# Patient Record
Sex: FEMALE | Race: WHITE | NOT HISPANIC OR LATINO | URBAN - METROPOLITAN AREA
[De-identification: names, ages, dates, MRNs, and addresses within clinical notes are randomized per-mention and may not be internally consistent; named-entity substitution may affect disease eponyms.]

---

## 2020-09-27 ENCOUNTER — OFFICE VISIT (OUTPATIENT)
Dept: URBAN - METROPOLITAN AREA CLINIC 121 | Facility: CLINIC | Age: 65
End: 2020-09-27

## 2021-07-09 ENCOUNTER — OFFICE VISIT (OUTPATIENT)
Dept: URBAN - METROPOLITAN AREA CLINIC 63 | Facility: CLINIC | Age: 66
End: 2021-07-09

## 2021-08-09 ENCOUNTER — OFFICE VISIT (OUTPATIENT)
Dept: URBAN - METROPOLITAN AREA CLINIC 63 | Facility: CLINIC | Age: 66
End: 2021-08-09

## 2021-08-25 ENCOUNTER — OFFICE VISIT (OUTPATIENT)
Dept: URBAN - METROPOLITAN AREA CLINIC 63 | Facility: CLINIC | Age: 66
End: 2021-08-25

## 2021-10-01 ENCOUNTER — OFFICE VISIT (OUTPATIENT)
Dept: URBAN - METROPOLITAN AREA CLINIC 121 | Facility: CLINIC | Age: 66
End: 2021-10-01

## 2021-11-03 ENCOUNTER — OFFICE VISIT (OUTPATIENT)
Dept: URBAN - METROPOLITAN AREA CLINIC 63 | Facility: CLINIC | Age: 66
End: 2021-11-03

## 2022-02-18 ENCOUNTER — OFFICE VISIT (OUTPATIENT)
Dept: URBAN - METROPOLITAN AREA CLINIC 63 | Facility: CLINIC | Age: 67
End: 2022-02-18

## 2022-03-04 ENCOUNTER — IMPORTED ENCOUNTER (OUTPATIENT)
Dept: URBAN - METROPOLITAN AREA CLINIC 31 | Facility: CLINIC | Age: 67
End: 2022-03-04

## 2022-03-04 PROBLEM — INACTIVE: Noted: 2022-03-04

## 2022-03-04 PROBLEM — H25.13: Noted: 2022-03-04

## 2022-03-04 PROBLEM — H25.12: Noted: 2022-03-04

## 2022-03-04 PROBLEM — H25.11: Noted: 2022-03-04

## 2022-03-04 PROCEDURE — 92015 DETERMINE REFRACTIVE STATE: CPT

## 2022-03-04 PROCEDURE — 99204 OFFICE O/P NEW MOD 45 MIN: CPT

## 2022-03-04 NOTE — PATIENT DISCUSSION
Discussed the risks benefits alternatives and limitations of cataract surgery including infection bleeding loss of vision retinal tears detachment. The patient stated a full understanding and a desire to proceed with the procedure in both eyes. Refractive options were reviewed. Patient has elected to be optimized for distance vision in both eyes. The patient will still need glasses for reading and to possibly fine tune distance vision. Schedule KPE/IOL OD/OS discussed no driving prior to surgery. Return for an appointment for Admit. with Dr. Darion Newman. Needs Full Admit.

## 2022-03-10 ENCOUNTER — OFFICE VISIT (OUTPATIENT)
Dept: URBAN - METROPOLITAN AREA TELEHEALTH 2 | Facility: TELEHEALTH | Age: 67
End: 2022-03-10

## 2022-04-02 ASSESSMENT — TONOMETRY
OD_IOP_MMHG: 15
OS_IOP_MMHG: 15

## 2022-04-02 ASSESSMENT — VISUAL ACUITY
OS_CC: 20/250
OS_PH: SC 20/30
OD_PH: SC 20/50

## 2022-04-18 ENCOUNTER — SURGERY/PROCEDURE (OUTPATIENT)
Dept: URBAN - METROPOLITAN AREA SURGERY 17 | Facility: SURGERY | Age: 67
End: 2022-04-18

## 2022-04-18 DIAGNOSIS — H25.11: ICD-10-CM

## 2022-04-18 PROCEDURE — 66984 XCAPSL CTRC RMVL W/O ECP: CPT

## 2022-04-18 NOTE — PATIENT DISCUSSION
Discussed the risks benefits alternatives and limitations of cataract surgery including infection bleeding loss of vision retinal tears detachment. The patient stated a full understanding and a desire to proceed with the procedure in both eyes. Refractive options were reviewed. Patient has elected to be optimized for distance vision in both eyes. The patient will still need glasses for reading and to possibly fine tune distance vision. Schedule KPE/IOL OD/OS discussed no driving prior to surgery. Return for an appointment for Admit. with Dr. Marcus Hernandez. Needs Full Admit.

## 2022-04-19 ENCOUNTER — POST-OP (OUTPATIENT)
Dept: URBAN - METROPOLITAN AREA CLINIC 29 | Facility: CLINIC | Age: 67
End: 2022-04-19

## 2022-04-19 ENCOUNTER — PREPPED CHART (OUTPATIENT)
Dept: URBAN - METROPOLITAN AREA CLINIC 29 | Facility: CLINIC | Age: 67
End: 2022-04-19

## 2022-04-19 DIAGNOSIS — Z96.1: ICD-10-CM

## 2022-04-19 ASSESSMENT — TONOMETRY: OD_IOP_MMHG: 17

## 2022-04-19 ASSESSMENT — VISUAL ACUITY
OS_SC: 20/60-1
OD_SC: 20/20-3

## 2022-04-19 NOTE — PATIENT DISCUSSION
Discussed the risks benefits alternatives and limitations of cataract surgery including infection bleeding loss of vision retinal tears detachment. The patient stated a full understanding and a desire to proceed with the procedure in both eyes. Refractive options were reviewed. Patient has elected to be optimized for distance vision in both eyes. The patient will still need glasses for reading and to possibly fine tune distance vision. Schedule KPE/IOL OD/OS discussed no driving prior to surgery. Return for an appointment for Admit. with Dr. Trenton Ware. Needs Full Admit.

## 2022-05-04 ENCOUNTER — SURGERY/PROCEDURE (OUTPATIENT)
Dept: URBAN - METROPOLITAN AREA SURGERY 17 | Facility: SURGERY | Age: 67
End: 2022-05-04

## 2022-05-04 DIAGNOSIS — H25.12: ICD-10-CM

## 2022-05-04 PROCEDURE — 66984 XCAPSL CTRC RMVL W/O ECP: CPT

## 2022-05-04 NOTE — PATIENT DISCUSSION
Discussed the risks benefits alternatives and limitations of cataract surgery including infection bleeding loss of vision retinal tears detachment. The patient stated a full understanding and a desire to proceed with the procedure in both eyes. Refractive options were reviewed. Patient has elected to be optimized for distance vision in both eyes. The patient will still need glasses for reading and to possibly fine tune distance vision. Schedule KPE/IOL OD/OS discussed no driving prior to surgery. Return for an appointment for Admit. with Dr. Rajani Powell. Needs Full Admit.

## 2022-05-05 ENCOUNTER — POST-OP (OUTPATIENT)
Dept: URBAN - METROPOLITAN AREA CLINIC 29 | Facility: CLINIC | Age: 67
End: 2022-05-05

## 2022-05-05 DIAGNOSIS — Z96.1: ICD-10-CM

## 2022-05-05 ASSESSMENT — TONOMETRY
OD_IOP_MMHG: 12
OS_IOP_MMHG: 14

## 2022-05-05 ASSESSMENT — VISUAL ACUITY
OS_SC: 20/30-3
OD_SC: 20/25+1

## 2022-05-19 ENCOUNTER — POST-OP (OUTPATIENT)
Dept: URBAN - METROPOLITAN AREA CLINIC 29 | Facility: CLINIC | Age: 67
End: 2022-05-19

## 2022-05-19 DIAGNOSIS — Z96.1: ICD-10-CM

## 2022-05-19 RX ORDER — BACITRACIN 500 [USP'U]/G: 1/2 OINTMENT OPHTHALMIC EVERY EVENING

## 2022-05-19 ASSESSMENT — VISUAL ACUITY
OS_SC: 20/25
OD_SC: 20/25

## 2022-07-09 ENCOUNTER — TELEPHONE ENCOUNTER (OUTPATIENT)
Dept: URBAN - METROPOLITAN AREA CLINIC 121 | Facility: CLINIC | Age: 67
End: 2022-07-09

## 2022-07-09 RX ORDER — DICYCLOMINE HYDROCHLORIDE 10 MG/1
EVERY 8 HOURS AS NEEDED FOR CRAMPING ABDOMINAL PAIN CAPSULE ORAL EVERY 8 HOURS
Refills: 2 | OUTPATIENT
Start: 2021-08-25 | End: 2021-11-16

## 2022-07-09 RX ORDER — CLONAZEPAM 0.5 MG/1
TABLET ORAL TWICE A DAY
Refills: 0 | OUTPATIENT
Start: 2021-08-25 | End: 2021-11-03

## 2022-07-09 RX ORDER — TELMISARTAN 80 MG/1
TABLET ORAL ONCE A DAY
Refills: 0 | OUTPATIENT
Start: 2022-02-18 | End: 2022-03-10

## 2022-07-09 RX ORDER — CLONAZEPAM 0.5 MG/1
TABLET ORAL TWICE A DAY
Refills: 0 | OUTPATIENT
Start: 2016-08-24 | End: 2016-08-24

## 2022-07-09 RX ORDER — AMLODIPINE BESYLATE 5 MG/1
TABLET ORAL
Refills: 0 | OUTPATIENT
Start: 2021-07-09 | End: 2021-08-25

## 2022-07-09 RX ORDER — ROSUVASTATIN CALCIUM 20 MG
TABLET ORAL
Refills: 0 | OUTPATIENT
Start: 2021-07-09 | End: 2021-08-25

## 2022-07-09 RX ORDER — OXYCODONE AND ACETAMINOPHEN 5; 325 MG/1; MG/1
TABLET ORAL
Refills: 0 | OUTPATIENT
Start: 2022-02-02 | End: 2022-03-10

## 2022-07-09 RX ORDER — ALBUTEROL SULFATE 90 UG/1
AEROSOL, METERED RESPIRATORY (INHALATION) TAKE AS DIRECTED
Refills: 0 | OUTPATIENT
Start: 2016-08-24 | End: 2021-07-09

## 2022-07-09 RX ORDER — ALBUTEROL SULFATE 2.5 MG/3ML
SOLUTION RESPIRATORY (INHALATION)
Refills: 0 | OUTPATIENT
Start: 2021-11-03 | End: 2022-02-18

## 2022-07-09 RX ORDER — CHOLECALCIFEROL (VITAMIN D3) 25 MCG
CAPSULE ORAL
Refills: 0 | OUTPATIENT
Start: 2016-07-19 | End: 2016-07-19

## 2022-07-09 RX ORDER — ONDANSETRON HYDROCHLORIDE 4 MG/2ML
INJECTION, SOLUTION INTRAMUSCULAR; INTRAVENOUS AS NEEDED
Refills: 0 | OUTPATIENT
Start: 2021-07-09 | End: 2021-08-25

## 2022-07-09 RX ORDER — CLONAZEPAM 2 MG/1
TABLET ORAL TWICE A DAY
Refills: 0 | OUTPATIENT
Start: 2016-07-19 | End: 2016-07-19

## 2022-07-09 RX ORDER — PAROXETINE HYDROCHLORIDE HEMIHYDRATE 20 MG/1
TABLET, FILM COATED ORAL ONCE A DAY
Refills: 0 | OUTPATIENT
Start: 2022-02-18 | End: 2022-03-10

## 2022-07-09 RX ORDER — ALBUTEROL SULFATE 90 UG/1
AEROSOL, METERED RESPIRATORY (INHALATION)
Refills: 0 | OUTPATIENT
Start: 2016-07-19 | End: 2016-08-24

## 2022-07-09 RX ORDER — TIOTROPIUM BROMIDE 18 UG/1
CAPSULE ORAL; RESPIRATORY (INHALATION) ONCE A DAY
Refills: 0 | OUTPATIENT
Start: 2019-06-07 | End: 2021-07-09

## 2022-07-09 RX ORDER — KETOTIFEN FUMARATE 0.25 MG/ML
SOLUTION/ DROPS OPHTHALMIC TWICE A DAY
Refills: 0 | OUTPATIENT
Start: 2021-07-09 | End: 2021-08-25

## 2022-07-09 RX ORDER — ALBUTEROL SULFATE 2.5 MG/3ML
SOLUTION RESPIRATORY (INHALATION)
Refills: 0 | OUTPATIENT
Start: 2022-02-18 | End: 2022-03-10

## 2022-07-09 RX ORDER — BENZALKONIUM CHLORIDE 1.3 MG/ML
BIPAP AT NIGHT FOR SLEEP APNEA CLOTH TOPICAL
Refills: 0 | OUTPATIENT
Start: 2021-11-03 | End: 2022-02-18

## 2022-07-09 RX ORDER — AMLODIPINE BESYLATE 5 MG/1
TABLET ORAL
Refills: 0 | OUTPATIENT
Start: 2021-08-25 | End: 2021-11-03

## 2022-07-09 RX ORDER — DICYCLOMINE HYDROCHLORIDE 10 MG/1
TAKE 1 CAPSULE BY MOUTH EVERY 8 HOURS AS NEEDED FOR CRAMPING ABDOMINAL PAIN CAPSULE ORAL
Refills: 0 | OUTPATIENT
Start: 2021-11-16 | End: 2022-02-18

## 2022-07-09 RX ORDER — ROSUVASTATIN CALCIUM 20 MG
TABLET ORAL
Refills: 0 | OUTPATIENT
Start: 2022-03-10 | End: 2022-03-10

## 2022-07-09 RX ORDER — TELMISARTAN 80 MG/1
TABLET ORAL ONCE A DAY
Refills: 0 | OUTPATIENT
Start: 2021-08-25 | End: 2021-11-03

## 2022-07-09 RX ORDER — CARBAMAZEPINE 200 MG/1
TABLET ORAL
Refills: 0 | OUTPATIENT
Start: 2022-02-08 | End: 2022-02-18

## 2022-07-09 RX ORDER — NADOLOL 40 MG/1
TABLET ORAL
Refills: 0 | OUTPATIENT
Start: 2016-08-24 | End: 2021-07-09

## 2022-07-09 RX ORDER — PAROXETINE HYDROCHLORIDE HEMIHYDRATE 20 MG/1
TABLET, FILM COATED ORAL ONCE A DAY
Refills: 0 | OUTPATIENT
Start: 2021-11-03 | End: 2022-02-18

## 2022-07-09 RX ORDER — PANTOPRAZOLE SODIUM 40 MG/1
TABLET, DELAYED RELEASE ORAL ONCE A DAY
Refills: 0 | OUTPATIENT
Start: 2021-08-25 | End: 2021-11-03

## 2022-07-09 RX ORDER — CEFDINIR 300 MG/1
CAPSULE ORAL TWICE A DAY
Refills: 0 | OUTPATIENT
Start: 2021-07-09 | End: 2021-07-09

## 2022-07-09 RX ORDER — AMLODIPINE BESYLATE 5 MG/1
TABLET ORAL
Refills: 0 | OUTPATIENT
Start: 2016-07-19 | End: 2016-08-24

## 2022-07-09 RX ORDER — ROSUVASTATIN CALCIUM 20 MG/1
TABLET, FILM COATED ORAL ONCE A DAY
Refills: 0 | OUTPATIENT
Start: 2022-03-10 | End: 2022-03-10

## 2022-07-09 RX ORDER — ROSUVASTATIN CALCIUM 20 MG
TABLET ORAL
Refills: 0 | OUTPATIENT
Start: 2021-08-25 | End: 2021-11-03

## 2022-07-09 RX ORDER — HYDROCHLOROTHIAZIDE 12.5 MG/1
CAPSULE ORAL ONCE A DAY
Refills: 0 | OUTPATIENT
Start: 2022-02-18 | End: 2022-02-18

## 2022-07-09 RX ORDER — ROSUVASTATIN CALCIUM 20 MG/1
TABLET, FILM COATED ORAL
Refills: 0 | OUTPATIENT
Start: 2022-02-28 | End: 2022-03-10

## 2022-07-09 RX ORDER — ONDANSETRON HYDROCHLORIDE 4 MG/2ML
INJECTION, SOLUTION INTRAMUSCULAR; INTRAVENOUS AS NEEDED
Refills: 0 | OUTPATIENT
Start: 2021-11-03 | End: 2021-11-03

## 2022-07-09 RX ORDER — TORSEMIDE 5 MG/1
TABLET ORAL
Refills: 0 | OUTPATIENT
Start: 2021-09-18 | End: 2021-11-03

## 2022-07-09 RX ORDER — NADOLOL 40 MG/1
TABLET ORAL
Refills: 0 | OUTPATIENT
Start: 2016-07-19 | End: 2016-08-24

## 2022-07-09 RX ORDER — BUDESONIDE, GLYCOPYRROLATE, AND FORMOTEROL FUMARATE 160; 9; 4.8 UG/1; UG/1; UG/1
AEROSOL, METERED RESPIRATORY (INHALATION) TWICE A DAY
Refills: 0 | OUTPATIENT
Start: 2022-02-18 | End: 2022-03-10

## 2022-07-09 RX ORDER — MONTELUKAST 10 MG/1
TABLET, FILM COATED ORAL
Refills: 0 | OUTPATIENT
Start: 2016-07-19 | End: 2016-07-19

## 2022-07-09 RX ORDER — TELMISARTAN 80 MG/1
TABLET ORAL ONCE A DAY
Refills: 0 | OUTPATIENT
Start: 2021-07-09 | End: 2021-08-25

## 2022-07-09 RX ORDER — ALBUTEROL SULFATE 2.5 MG/3ML
SOLUTION RESPIRATORY (INHALATION)
Refills: 0 | OUTPATIENT
Start: 2021-08-25 | End: 2021-11-03

## 2022-07-09 RX ORDER — AMLODIPINE BESYLATE 5 MG/1
TABLET ORAL
Refills: 0 | OUTPATIENT
Start: 2021-11-03 | End: 2022-02-18

## 2022-07-09 RX ORDER — PANTOPRAZOLE SODIUM 40 MG/1
TABLET, DELAYED RELEASE ORAL ONCE A DAY
Refills: 0 | OUTPATIENT
Start: 2021-07-09 | End: 2021-08-25

## 2022-07-09 RX ORDER — CLONAZEPAM 0.5 MG/1
TABLET ORAL TWICE A DAY
Refills: 0 | OUTPATIENT
Start: 2021-07-09 | End: 2021-08-25

## 2022-07-09 RX ORDER — PAROXETINE HYDROCHLORIDE 10 MG/1
TABLET ORAL
Refills: 0 | OUTPATIENT
Start: 2016-08-24 | End: 2021-07-09

## 2022-07-09 RX ORDER — OXYCODONE AND ACETAMINOPHEN 5; 325 MG/1; MG/1
TABLET ORAL
Refills: 0 | OUTPATIENT
Start: 2022-02-02 | End: 2022-02-18

## 2022-07-09 RX ORDER — CLONAZEPAM 0.5 MG/1
TABLET ORAL TWICE A DAY
Refills: 0 | OUTPATIENT
Start: 2021-11-03 | End: 2022-02-18

## 2022-07-09 RX ORDER — PANTOPRAZOLE SODIUM 40 MG/1
TABLET, DELAYED RELEASE ORAL
Refills: 0 | OUTPATIENT
Start: 2021-05-28 | End: 2021-11-03

## 2022-07-09 RX ORDER — ROSUVASTATIN CALCIUM 20 MG
TABLET ORAL
Refills: 0 | OUTPATIENT
Start: 2016-07-19 | End: 2021-07-09

## 2022-07-09 RX ORDER — AMLODIPINE BESYLATE 5 MG/1
TABLET ORAL
Refills: 0 | OUTPATIENT
Start: 2016-08-24 | End: 2021-07-09

## 2022-07-09 RX ORDER — ALBUTEROL SULFATE 2.5 MG/3ML
SOLUTION RESPIRATORY (INHALATION)
Refills: 0 | OUTPATIENT
Start: 2021-07-09 | End: 2021-08-25

## 2022-07-09 RX ORDER — NICOTINE 11MG/24HR
PATCH, TRANSDERMAL 24 HOURS TRANSDERMAL
Refills: 0 | OUTPATIENT
Start: 2016-07-19 | End: 2016-07-19

## 2022-07-09 RX ORDER — TOPIRAMATE 25 MG/1
TABLET ORAL TWICE A DAY
Refills: 0 | OUTPATIENT
Start: 2016-07-19 | End: 2021-07-09

## 2022-07-09 RX ORDER — KETOTIFEN FUMARATE 0.25 MG/ML
SOLUTION/ DROPS OPHTHALMIC TWICE A DAY
Refills: 0 | OUTPATIENT
Start: 2021-08-25 | End: 2021-08-25

## 2022-07-09 RX ORDER — PAROXETINE HYDROCHLORIDE 10 MG/1
TABLET ORAL
Refills: 0 | OUTPATIENT
Start: 2016-07-19 | End: 2016-08-24

## 2022-07-09 RX ORDER — CLONAZEPAM 0.5 MG/1
0.5 TABS BID TABLET ORAL TAKE AS DIRECTED
Refills: 0 | OUTPATIENT
Start: 2022-02-18 | End: 2022-03-10

## 2022-07-09 RX ORDER — TOPIRAMATE 100 MG/1
TABLET ORAL
Refills: 0 | OUTPATIENT
Start: 2022-02-08 | End: 2022-02-18

## 2022-07-09 RX ORDER — AMLODIPINE BESYLATE 5 MG/1
TABLET ORAL
Refills: 0 | OUTPATIENT
Start: 2016-08-24 | End: 2016-08-24

## 2022-07-09 RX ORDER — ONDANSETRON HYDROCHLORIDE 4 MG/2ML
INJECTION, SOLUTION INTRAMUSCULAR; INTRAVENOUS AS NEEDED
Refills: 0 | OUTPATIENT
Start: 2021-08-25 | End: 2021-11-03

## 2022-07-09 RX ORDER — PAROXETINE HYDROCHLORIDE HEMIHYDRATE 20 MG/1
TABLET, FILM COATED ORAL ONCE A DAY
Refills: 0 | OUTPATIENT
Start: 2021-08-25 | End: 2021-11-03

## 2022-07-09 RX ORDER — CLONAZEPAM 0.5 MG/1
TABLET ORAL TWICE A DAY
Refills: 0 | OUTPATIENT
Start: 2016-07-19 | End: 2016-08-24

## 2022-07-09 RX ORDER — FLUTICASONE PROPIONATE 50 UG/1
SPRAY, METERED NASAL
Refills: 0 | OUTPATIENT
Start: 2016-07-19 | End: 2021-07-09

## 2022-07-09 RX ORDER — HYDROCHLOROTHIAZIDE 12.5 MG/1
CAPSULE ORAL
Refills: 0 | OUTPATIENT
Start: 2021-12-14 | End: 2022-02-18

## 2022-07-09 RX ORDER — CARBAMAZEPINE 200 MG/1
TABLET ORAL
Refills: 0 | OUTPATIENT
Start: 2021-09-11 | End: 2021-11-03

## 2022-07-09 RX ORDER — PAROXETINE HYDROCHLORIDE HEMIHYDRATE 20 MG/1
TABLET, FILM COATED ORAL ONCE A DAY
Refills: 0 | OUTPATIENT
Start: 2021-07-09 | End: 2021-08-25

## 2022-07-09 RX ORDER — BENZALKONIUM CHLORIDE 1.3 MG/ML
CLOTH TOPICAL
Refills: 0 | OUTPATIENT
Start: 2021-11-03 | End: 2021-11-03

## 2022-07-09 RX ORDER — TELMISARTAN 80 MG/1
TABLET ORAL ONCE A DAY
Refills: 0 | OUTPATIENT
Start: 2021-11-03 | End: 2022-02-18

## 2022-07-09 RX ORDER — ONDANSETRON 8 MG/1
TABLET, ORALLY DISINTEGRATING ORAL
Refills: 0 | OUTPATIENT
Start: 2021-05-26 | End: 2021-11-03

## 2022-07-09 RX ORDER — BENZALKONIUM CHLORIDE 1.3 MG/ML
BIPAP AT NIGHT FOR SLEEP APNEA CLOTH TOPICAL
Refills: 0 | OUTPATIENT
Start: 2022-02-18 | End: 2022-03-10

## 2022-07-09 RX ORDER — PANTOPRAZOLE SODIUM 40 MG/1
TABLET, DELAYED RELEASE ORAL
Refills: 0 | OUTPATIENT
Start: 2021-08-25 | End: 2022-02-18

## 2022-07-09 RX ORDER — TOPIRAMATE 100 MG/1
TABLET ORAL
Refills: 0 | OUTPATIENT
Start: 2021-09-09 | End: 2021-11-03

## 2022-07-09 RX ORDER — ROSUVASTATIN CALCIUM 20 MG
TABLET ORAL
Refills: 0 | OUTPATIENT
Start: 2021-11-03 | End: 2022-02-18

## 2022-07-09 RX ORDER — CLONAZEPAM 0.5 MG/1
TABLET ORAL TWICE A DAY
Refills: 0 | OUTPATIENT
Start: 2016-08-24 | End: 2021-07-09

## 2022-07-09 RX ORDER — ROSUVASTATIN CALCIUM 20 MG
TABLET ORAL
Refills: 0 | OUTPATIENT
Start: 2022-02-18 | End: 2022-03-10

## 2022-07-09 RX ORDER — BUDESONIDE, GLYCOPYRROLATE, AND FORMOTEROL FUMARATE 160; 9; 4.8 UG/1; UG/1; UG/1
AEROSOL, METERED RESPIRATORY (INHALATION) TWICE A DAY
Refills: 0 | OUTPATIENT
Start: 2022-03-10 | End: 2022-03-10

## 2022-07-09 RX ORDER — ASPIRIN 81 MG/1
TABLET, DELAYED RELEASE ORAL
Refills: 0 | OUTPATIENT
Start: 2016-07-19 | End: 2016-07-19

## 2022-07-10 ENCOUNTER — TELEPHONE ENCOUNTER (OUTPATIENT)
Dept: URBAN - METROPOLITAN AREA CLINIC 121 | Facility: CLINIC | Age: 67
End: 2022-07-10

## 2022-07-10 RX ORDER — TELMISARTAN 80 MG/1
TABLET ORAL ONCE A DAY
Refills: 0 | Status: ACTIVE | COMMUNITY
Start: 2022-03-10

## 2022-07-10 RX ORDER — PAROXETINE HYDROCHLORIDE HEMIHYDRATE 20 MG/1
TABLET, FILM COATED ORAL ONCE A DAY
Refills: 0 | Status: ACTIVE | COMMUNITY
Start: 2022-03-10

## 2022-07-10 RX ORDER — TOPIRAMATE 100 MG/1
TABLET ORAL ONCE A DAY
Refills: 0 | Status: ACTIVE | COMMUNITY
Start: 2021-11-03

## 2022-07-10 RX ORDER — ALBUTEROL SULFATE 2.5 MG/3ML
SOLUTION RESPIRATORY (INHALATION)
Refills: 0 | Status: ACTIVE | COMMUNITY
Start: 2022-03-10

## 2022-07-10 RX ORDER — ONDANSETRON 8 MG/1
TWICE A DAY AS NEEDED FOR NAUSEA TABLET, ORALLY DISINTEGRATING ORAL TWICE A DAY
Refills: 4 | Status: ACTIVE | COMMUNITY
Start: 2021-11-03

## 2022-07-10 RX ORDER — BUDESONIDE, GLYCOPYRROLATE, AND FORMOTEROL FUMARATE 160; 9; 4.8 UG/1; UG/1; UG/1
AEROSOL, METERED RESPIRATORY (INHALATION) AS NEEDED
Refills: 0 | Status: ACTIVE | COMMUNITY
Start: 2022-03-10

## 2022-07-10 RX ORDER — CLONAZEPAM 0.5 MG/1
0.5 TABS BID TABLET ORAL TAKE AS DIRECTED
Refills: 0 | Status: ACTIVE | COMMUNITY
Start: 2022-03-10

## 2022-07-10 RX ORDER — ROSUVASTATIN CALCIUM 20 MG/1
TABLET, FILM COATED ORAL
Refills: 0 | Status: ACTIVE | COMMUNITY
Start: 2022-03-10

## 2022-07-10 RX ORDER — CARBAMAZEPINE 200 MG/1
TABLET ORAL
Refills: 0 | Status: ACTIVE | COMMUNITY
Start: 2021-11-03

## 2022-07-10 RX ORDER — BENZALKONIUM CHLORIDE 1.3 MG/ML
BIPAP AT NIGHT FOR SLEEP APNEA CLOTH TOPICAL
Refills: 0 | Status: ACTIVE | COMMUNITY
Start: 2022-03-10

## 2022-07-10 RX ORDER — TORSEMIDE 5 MG/1
TABLET ORAL ONCE A DAY
Refills: 0 | Status: ACTIVE | COMMUNITY
Start: 2021-11-03

## 2023-01-04 ENCOUNTER — APPOINTMENT (RX ONLY)
Dept: URBAN - METROPOLITAN AREA CLINIC 335 | Facility: CLINIC | Age: 68
Setting detail: DERMATOLOGY
End: 2023-01-04

## 2023-01-04 DIAGNOSIS — L40.0 PSORIASIS VULGARIS: ICD-10-CM | Status: INADEQUATELY CONTROLLED

## 2023-01-04 DIAGNOSIS — L82.0 INFLAMED SEBORRHEIC KERATOSIS: ICD-10-CM | Status: INADEQUATELY CONTROLLED

## 2023-01-04 PROBLEM — L30.9 DERMATITIS, UNSPECIFIED: Status: ACTIVE | Noted: 2023-01-04

## 2023-01-04 PROBLEM — L56.5 DISSEMINATED SUPERFICIAL ACTINIC POROKERATOSIS (DSAP): Status: ACTIVE | Noted: 2023-01-04

## 2023-01-04 PROBLEM — D48.5 NEOPLASM OF UNCERTAIN BEHAVIOR OF SKIN: Status: ACTIVE | Noted: 2023-01-04

## 2023-01-04 PROCEDURE — 11104 PUNCH BX SKIN SINGLE LESION: CPT

## 2023-01-04 PROCEDURE — 11105 PUNCH BX SKIN EA SEP/ADDL: CPT

## 2023-01-04 PROCEDURE — 99204 OFFICE O/P NEW MOD 45 MIN: CPT | Mod: 25

## 2023-01-04 PROCEDURE — ? COUNSELING

## 2023-01-04 PROCEDURE — ? BIOPSY BY PUNCH METHOD

## 2023-01-04 PROCEDURE — ? PRESCRIPTION

## 2023-01-04 RX ORDER — TAPINAROF 10 MG/1000MG
CREAM TOPICAL
Qty: 60 | Refills: 0 | Status: ERX | COMMUNITY
Start: 2023-01-04

## 2023-01-04 RX ADMIN — TAPINAROF: 10 CREAM TOPICAL at 00:00

## 2023-01-04 ASSESSMENT — PGA PSORIASIS: PGA PSORIASIS 2020: SEVERE

## 2023-01-04 ASSESSMENT — BSA PSORIASIS: % BODY COVERED IN PSORIASIS: 50

## 2023-01-04 ASSESSMENT — LOCATION SIMPLE DESCRIPTION DERM
LOCATION SIMPLE: LEFT FOREARM
LOCATION SIMPLE: RIGHT CALF

## 2023-01-04 ASSESSMENT — LOCATION ZONE DERM
LOCATION ZONE: LEG
LOCATION ZONE: ARM

## 2023-01-04 ASSESSMENT — LOCATION DETAILED DESCRIPTION DERM
LOCATION DETAILED: LEFT DISTAL DORSAL FOREARM
LOCATION DETAILED: RIGHT PROXIMAL MEDIAL CALF

## 2023-01-04 NOTE — PROCEDURE: BIOPSY BY PUNCH METHOD

## 2023-01-05 ENCOUNTER — RX ONLY (OUTPATIENT)
Age: 68
Setting detail: RX ONLY
End: 2023-01-05

## 2023-01-05 RX ORDER — CLOBETASOL PROPIONATE 0.5 MG/G
CREAM TOPICAL
Qty: 45 | Refills: 0 | Status: CANCELLED | COMMUNITY
Start: 2023-01-05

## 2023-01-18 ENCOUNTER — RX ONLY (OUTPATIENT)
Age: 68
Setting detail: RX ONLY
End: 2023-01-18

## 2023-01-18 ENCOUNTER — APPOINTMENT (RX ONLY)
Dept: URBAN - METROPOLITAN AREA CLINIC 335 | Facility: CLINIC | Age: 68
Setting detail: DERMATOLOGY
End: 2023-01-18

## 2023-01-18 DIAGNOSIS — L57.0 ACTINIC KERATOSIS: ICD-10-CM | Status: INADEQUATELY CONTROLLED

## 2023-01-18 DIAGNOSIS — L40.0 PSORIASIS VULGARIS: ICD-10-CM | Status: INADEQUATELY CONTROLLED

## 2023-01-18 DIAGNOSIS — L81.1 CHLOASMA: ICD-10-CM | Status: INADEQUATELY CONTROLLED

## 2023-01-18 PROCEDURE — 99214 OFFICE O/P EST MOD 30 MIN: CPT | Mod: 25

## 2023-01-18 PROCEDURE — ? PATHOLOGY DISCUSSION

## 2023-01-18 PROCEDURE — ? LIQUID NITROGEN

## 2023-01-18 PROCEDURE — ? IN-HOUSE DISPENSING PHARMACY

## 2023-01-18 PROCEDURE — ? PRESCRIPTION

## 2023-01-18 PROCEDURE — 17000 DESTRUCT PREMALG LESION: CPT

## 2023-01-18 PROCEDURE — ? PRESCRIPTION MEDICATION MANAGEMENT

## 2023-01-18 PROCEDURE — 17003 DESTRUCT PREMALG LES 2-14: CPT

## 2023-01-18 PROCEDURE — ? COUNSELING

## 2023-01-18 RX ORDER — TAPINAROF 10 MG/1000MG
CREAM TOPICAL
Qty: 60 | Refills: 0 | Status: CANCELLED

## 2023-01-18 RX ORDER — TAPINAROF 10 MG/1000MG
CREAM TOPICAL
Qty: 60 | Refills: 3 | Status: CANCELLED

## 2023-01-18 RX ORDER — CALCIPOTRIENE AND BETAMETHASONE DIPROPIONATE 50; 64 UG/G; MG/G
CREAM TOPICAL
Qty: 60 | Refills: 0 | Status: ERX | COMMUNITY
Start: 2023-01-18

## 2023-01-18 ASSESSMENT — ITCH NUMERIC RATING SCALE: HOW SEVERE IS YOUR ITCHING?: 10

## 2023-01-18 ASSESSMENT — LOCATION DETAILED DESCRIPTION DERM
LOCATION DETAILED: RIGHT DISTAL PRETIBIAL REGION
LOCATION DETAILED: LEFT PROXIMAL PRETIBIAL REGION
LOCATION DETAILED: RIGHT FOREHEAD
LOCATION DETAILED: LEFT DISTAL DORSAL FOREARM
LOCATION DETAILED: RIGHT CENTRAL TEMPLE

## 2023-01-18 ASSESSMENT — LOCATION ZONE DERM
LOCATION ZONE: LEG
LOCATION ZONE: FACE
LOCATION ZONE: ARM

## 2023-01-18 ASSESSMENT — LOCATION SIMPLE DESCRIPTION DERM
LOCATION SIMPLE: RIGHT PRETIBIAL REGION
LOCATION SIMPLE: RIGHT TEMPLE
LOCATION SIMPLE: RIGHT FOREHEAD
LOCATION SIMPLE: LEFT FOREARM
LOCATION SIMPLE: LEFT PRETIBIAL REGION

## 2023-01-18 ASSESSMENT — PGA PSORIASIS: PGA PSORIASIS 2020: SEVERE

## 2023-01-18 ASSESSMENT — BSA PSORIASIS: % BODY COVERED IN PSORIASIS: 40

## 2023-01-18 NOTE — PROCEDURE: LIQUID NITROGEN
Application Tool (Optional): Liquid Nitrogen Sprayer
Show Applicator Variable?: Yes
Duration Of Freeze Thaw-Cycle (Seconds): 2
Render Note In Bullet Format When Appropriate: No
Detail Level: Detailed
Number Of Freeze-Thaw Cycles: 2 freeze-thaw cycles
Post-Care Instructions: I reviewed with the patient in detail post-care instructions. Patient is to wear sunprotection, and avoid picking at any of the treated lesions. Pt may apply Vaseline to crusted or scabbing areas.
Consent: The patient's consent was obtained including but not limited to risks of crusting, scabbing, blistering, scarring, darker or lighter pigmentary change, recurrence, incomplete removal and infection.

## 2023-01-18 NOTE — PROCEDURE: COUNSELING
Cleansers Recommendations: Cetaphil/CeraVe soap and cream- wash the body with the bar soap and moisturize with the cream at least twice a day to keep the body moisturized.  When there is no rash, continue with the Cetaphil everyday to maintain moisture.
Detail Level: Detailed
Detail Level: Zone

## 2023-01-18 NOTE — PROCEDURE: IN-HOUSE DISPENSING PHARMACY
Product 17 Application Directions: Apply to affected areas twice daily
Product 71 Price/Unit (In Dollars): 0
Product 15 Amount/Unit (Numbers Only): 120
Name Of Product 22: Prenisone 20 mg
Product 5 Price/Unit (In Dollars): 45
Product 9 Amount/Unit (Numbers Only): 60
Product 33 Unit Type: mg
Product 13 Application Directions: Apply to the entire face twice daily
Name Of Product 3: (#3)ACNE GEL COMBO
Product 24 Refills: 3
Product 7 Application Directions: Paint onto affected mails every night.  Once a week, clean nails with acetone or alcohol.
Product 17 Unit Type: ml
Product 20 Price/Unit (In Dollars): 10
Name Of Product 18: Bactrim
Product 1 Price/Unit (In Dollars): 55
Product 11 Amount/Unit (Numbers Only): 30
Product 22 Application Directions: Take 3 tablets by mouth for 3 days, then 2 tablets for 3 days, then 1 tablet for 3 days
Product 16 Price/Unit (In Dollars): 35
Product 3 Application Directions: Apply a thin layer to face every morning
Name Of Product 14: #14 ROSACEA SILICONE GEL
Name Of Product 8: (#08)  ANTI-FUNGAL CREAM
Product 22 Unit Type: tablets
Product 20 Amount/Unit (Numbers Only): 15
Product 18 Application Directions: Take 1 tablet twice a day for 10 days
Product 12 Price/Unit (In Dollars): 60.00
Name Of Product 23: Prednisone 50 mg
Product 18 Amount/Unit (Numbers Only): 20
Product 12 Units Dispensed: 1
Name Of Product 21: minocycline 100 mg
Product 12 Application Directions: For ultimate results we recommend using the Melasma Emulsion with revision complex, a product sold through our office. Apply a thin layer of Melasma Emulsion to the entire face at night time. \\nEvery morning you will apply  a thin layer  of revision to the entire face. You will do the above regimen for 2 months. After 2 months, you will temporarily discontinue the Melasma Emulsion & only use the revision twice a day for 2 months. After the 4 months, we recommend that you have a follow up appointment with your provider to evaluate if any other changes are needed.   \\n If the  skin gets too dry or irritated with the Melasma Emulsion, then use it every third night.  The Melasma Emulsion will make your skin more sun-sensitive. Use a sunscreen daily of at least SPF 30, also \\n reapplying throughout the day is very important. Because Melasma Emulsion not meant to be used long term due to the potential side effects, we recommend to use lytera on your off days.
Name Of Product 2: #02) ACNE MOISTURIZING CREAM
Product 6 Application Directions: Apply a small amount onto scalp three times a week
Name Of Product 11: (#11) ANTIBACTERIAL WOUND PETROLATUM OINTMENT
Name Of Product 17: #17 XEROSIS GEL
Product 23 Amount/Unit (Numbers Only): 5
Product 21 Application Directions: Take 1 tablet by mouth twice a day
Name Of Product 13: #13 ROSACEA CREAM
Product 2 Application Directions: Apply a thin layer to face every night
Name Of Product 7: (#07) ANTI-FUNGAL NAIL SOLUTION
Product 21 Unit Type: capsules
Product 9 Application Directions: Apply to affected area three times a week
Product 17 Amount/Unit (Numbers Only): 240
Render Refills If Set To 0: Yes
Product 24 Application Directions: Take 1 capsule twice daily
Name Of Product 20: Loratadine
Product 11 Application Directions: Apply to affected area twice a day
Name Of Product 1: (#01) ACNE GEL WITH DAPSONE
Product 5 Application Directions: Apply to affected areas every other day
Name Of Product 16: #16DERMATITIS TOPICAL SOLUTION
Name Of Product 10: #10 FUNGAL DERMATITIS CREAM
Product 22 Amount/Unit (Numbers Only): 18
Product 20 Application Directions: Take 1 tablet daily
Name Of Product 12: #12MELASMA EMULSION
Product 16 Application Directions: Apply a small amount to the scalp three times a week
Name Of Product 6: Alopecia Topical solution
Name Of Product 4: (#04) ACNE GEL
Product 14 Application Directions: Apply a thin layer to the entire face twice a day
Product 12 Refills: 4
Name Of Product 19: Doxcycline
Product 23 Application Directions: Take 1 tablet every morning for 5 days
Product 2 Price/Unit (In Dollars): 50
Detail Level: Zone
Name Of Product 15: #15 ANTI-FUNGAL SHAMPOO
Product 4 Application Directions: Apply a thin layer to the face every night
Name Of Product 9: (#09) DERMATITIS CREAM
Product 19 Application Directions: Take 1 capsule twice a day
Name Of Product 24: Spironolactone
Name Of Product 5: #05)ACTINIC KERATOSIS GEL
Product 15 Application Directions: Shampoo into scalp three times a week.

## 2023-03-20 ENCOUNTER — APPOINTMENT (RX ONLY)
Dept: URBAN - METROPOLITAN AREA CLINIC 335 | Facility: CLINIC | Age: 68
Setting detail: DERMATOLOGY
End: 2023-03-20

## 2023-03-20 DIAGNOSIS — L57.0 ACTINIC KERATOSIS: ICD-10-CM | Status: RESOLVED

## 2023-03-20 DIAGNOSIS — L81.1 CHLOASMA: ICD-10-CM

## 2023-03-20 DIAGNOSIS — L82.0 INFLAMED SEBORRHEIC KERATOSIS: ICD-10-CM | Status: INADEQUATELY CONTROLLED

## 2023-03-20 DIAGNOSIS — L40.0 PSORIASIS VULGARIS: ICD-10-CM | Status: IMPROVED

## 2023-03-20 PROCEDURE — ? PRESCRIPTION MEDICATION MANAGEMENT

## 2023-03-20 PROCEDURE — 99214 OFFICE O/P EST MOD 30 MIN: CPT | Mod: 25

## 2023-03-20 PROCEDURE — ? LIQUID NITROGEN

## 2023-03-20 PROCEDURE — 17110 DESTRUCTION B9 LES UP TO 14: CPT

## 2023-03-20 PROCEDURE — ? COUNSELING

## 2023-03-20 PROCEDURE — ? IN-HOUSE DISPENSING PHARMACY

## 2023-03-20 ASSESSMENT — LOCATION SIMPLE DESCRIPTION DERM
LOCATION SIMPLE: RIGHT FOREHEAD
LOCATION SIMPLE: LEFT PRETIBIAL REGION
LOCATION SIMPLE: RIGHT PRETIBIAL REGION
LOCATION SIMPLE: LEFT FOREARM
LOCATION SIMPLE: RIGHT TEMPLE

## 2023-03-20 ASSESSMENT — LOCATION DETAILED DESCRIPTION DERM
LOCATION DETAILED: LEFT DISTAL DORSAL FOREARM
LOCATION DETAILED: LEFT PROXIMAL PRETIBIAL REGION
LOCATION DETAILED: RIGHT CENTRAL TEMPLE
LOCATION DETAILED: RIGHT DISTAL PRETIBIAL REGION
LOCATION DETAILED: RIGHT FOREHEAD

## 2023-03-20 ASSESSMENT — LOCATION ZONE DERM
LOCATION ZONE: FACE
LOCATION ZONE: ARM
LOCATION ZONE: LEG

## 2023-04-03 ENCOUNTER — APPOINTMENT (RX ONLY)
Dept: URBAN - METROPOLITAN AREA CLINIC 335 | Facility: CLINIC | Age: 68
Setting detail: DERMATOLOGY
End: 2023-04-03

## 2023-04-03 DIAGNOSIS — L82.0 INFLAMED SEBORRHEIC KERATOSIS: ICD-10-CM | Status: IMPROVED

## 2023-04-03 PROCEDURE — ? COUNSELING

## 2023-04-03 PROCEDURE — 99212 OFFICE O/P EST SF 10 MIN: CPT

## 2023-04-03 ASSESSMENT — LOCATION SIMPLE DESCRIPTION DERM: LOCATION SIMPLE: RIGHT TEMPLE

## 2023-04-03 ASSESSMENT — LOCATION ZONE DERM: LOCATION ZONE: FACE

## 2023-04-03 ASSESSMENT — LOCATION DETAILED DESCRIPTION DERM: LOCATION DETAILED: RIGHT CENTRAL TEMPLE

## 2023-04-27 ENCOUNTER — RX ONLY (OUTPATIENT)
Age: 68
Setting detail: RX ONLY
End: 2023-04-27

## 2023-04-27 RX ORDER — CALCIPOTRIENE AND BETAMETHASONE DIPROPIONATE 50; 64 UG/G; MG/G
CREAM TOPICAL
Qty: 60 | Refills: 2 | Status: ERX

## 2023-05-01 ENCOUNTER — RX ONLY (OUTPATIENT)
Age: 68
Setting detail: RX ONLY
End: 2023-05-01

## 2023-05-01 RX ORDER — CALCIPOTRIENE AND BETAMETHASONE DIPROPIONATE 50; 64 UG/G; MG/G
CREAM TOPICAL
Qty: 60 | Refills: 2 | Status: ERX

## 2023-08-08 ENCOUNTER — EMERGENCY VISIT (OUTPATIENT)
Dept: URBAN - METROPOLITAN AREA CLINIC 29 | Facility: CLINIC | Age: 68
End: 2023-08-08

## 2023-08-08 ENCOUNTER — NEW PATIENT (OUTPATIENT)
Dept: URBAN - METROPOLITAN AREA CLINIC 26 | Facility: CLINIC | Age: 68
End: 2023-08-08

## 2023-08-08 VITALS
HEIGHT: 64 IN | SYSTOLIC BLOOD PRESSURE: 130 MMHG | WEIGHT: 190 LBS | BODY MASS INDEX: 32.44 KG/M2 | HEART RATE: 92 BPM | DIASTOLIC BLOOD PRESSURE: 79 MMHG

## 2023-08-08 DIAGNOSIS — H04.123: ICD-10-CM

## 2023-08-08 DIAGNOSIS — H35.61: ICD-10-CM

## 2023-08-08 DIAGNOSIS — H53.8: ICD-10-CM

## 2023-08-08 DIAGNOSIS — H35.373: ICD-10-CM

## 2023-08-08 DIAGNOSIS — H34.8110: ICD-10-CM

## 2023-08-08 PROCEDURE — 92235 FLUORESCEIN ANGRPH MLTIFRAME: CPT

## 2023-08-08 PROCEDURE — 99214 OFFICE O/P EST MOD 30 MIN: CPT

## 2023-08-08 PROCEDURE — 92250 FUNDUS PHOTOGRAPHY W/I&R: CPT

## 2023-08-08 PROCEDURE — 99204 OFFICE O/P NEW MOD 45 MIN: CPT

## 2023-08-08 PROCEDURE — 92134 CPTRZ OPH DX IMG PST SGM RTA: CPT

## 2023-08-08 ASSESSMENT — VISUAL ACUITY
OD_SC: 20/50
OS_SC: 20/20
OS_SC: 20/20-1
OD_SC: 20/50
OD_PH: 20/40

## 2023-08-08 ASSESSMENT — TONOMETRY
OS_IOP_MMHG: 13
OS_IOP_MMHG: 17
OD_IOP_MMHG: 15
OD_IOP_MMHG: 16

## 2024-03-05 ENCOUNTER — OV EP (OUTPATIENT)
Dept: URBAN - METROPOLITAN AREA CLINIC 60 | Facility: CLINIC | Age: 69
End: 2024-03-05
Payer: MEDICARE

## 2024-03-05 VITALS
TEMPERATURE: 98.7 F | HEIGHT: 64 IN | RESPIRATION RATE: 12 BRPM | BODY MASS INDEX: 32.3 KG/M2 | SYSTOLIC BLOOD PRESSURE: 120 MMHG | WEIGHT: 189.2 LBS | HEART RATE: 67 BPM | DIASTOLIC BLOOD PRESSURE: 68 MMHG | OXYGEN SATURATION: 98 %

## 2024-03-05 DIAGNOSIS — R93.89 ABNORMAL FINDING ON IMAGING: ICD-10-CM

## 2024-03-05 DIAGNOSIS — K76.0 FATTY LIVER: ICD-10-CM

## 2024-03-05 DIAGNOSIS — Z86.010 PERSONAL HISTORY OF COLONIC POLYPS: ICD-10-CM

## 2024-03-05 DIAGNOSIS — F17.219 CIGARETTE NICOTINE DEPENDENCE WITH NICOTINE-INDUCED DISORDER: ICD-10-CM

## 2024-03-05 PROBLEM — 408574004: Status: ACTIVE | Noted: 2024-03-05

## 2024-03-05 PROBLEM — 89765005: Status: ACTIVE | Noted: 2024-03-05

## 2024-03-05 PROBLEM — 197321007: Status: ACTIVE | Noted: 2024-03-05

## 2024-03-05 PROCEDURE — 99215 OFFICE O/P EST HI 40 MIN: CPT | Performed by: INTERNAL MEDICINE

## 2024-03-05 RX ORDER — HYDROCHLOROTHIAZIDE 25 MG/1
TABLET ORAL
Qty: 90 TABLET | Status: ACTIVE | COMMUNITY

## 2024-03-05 RX ORDER — BUDESONIDE, GLYCOPYRROLATE, AND FORMOTEROL FUMARATE 160; 9; 4.8 UG/1; UG/1; UG/1
AEROSOL, METERED RESPIRATORY (INHALATION) AS NEEDED
Refills: 0 | Status: ACTIVE | COMMUNITY
Start: 2022-03-10

## 2024-03-05 RX ORDER — BENZALKONIUM CHLORIDE 1.3 MG/ML
BIPAP AT NIGHT FOR SLEEP APNEA CLOTH TOPICAL
Refills: 0 | Status: ACTIVE | COMMUNITY
Start: 2022-03-10

## 2024-03-05 RX ORDER — PAROXETINE HYDROCHLORIDE HEMIHYDRATE 20 MG/1
TABLET, FILM COATED ORAL ONCE A DAY
Refills: 0 | Status: ACTIVE | COMMUNITY
Start: 2022-03-10

## 2024-03-05 RX ORDER — ROSUVASTATIN CALCIUM 20 MG/1
TAKE 1 TABLET BY MOUTH EVERY DAY TABLET, FILM COATED ORAL
Qty: 90 EACH | Refills: 0 | Status: ACTIVE | COMMUNITY

## 2024-03-05 RX ORDER — CLONAZEPAM 0.5 MG/1
0.5 TABS BID TABLET ORAL TAKE AS DIRECTED
Refills: 0 | Status: ACTIVE | COMMUNITY
Start: 2022-03-10

## 2024-03-05 RX ORDER — GABAPENTIN 300 MG/1
CAPSULE ORAL
Qty: 270 CAPSULE | Status: ACTIVE | COMMUNITY

## 2024-03-05 RX ORDER — TELMISARTAN 80 MG/1
TABLET ORAL
Qty: 90 TABLET | Status: ACTIVE | COMMUNITY

## 2024-03-05 NOTE — HPI-PREVIOUS PROCEDURES
Colonoscopy 2019: Adequate prep, 3 small polyps in the transverse colon-tubular adenoma, 1 cm polyp in the rectum and rectosigmoid colon measuring 5 to 9 mm in size showing mixed tubular adenoma and hyperplastic polyps. Sigmoid diverticulosis. Random biopsies negative for microscopic colitis-recommended recall colonoscopy in 3 years (2022),  Endoscopy 2019: Normal esophagus, small hiatal hernia, H. pylori negative gastritis, unremarkable duodenal biopsies.  Colonoscopy 2016: Good prep, 3 small polyps in the transverse colon-tubular adenomas, 8 mm descending colon hyperplastic polyp, multiple hyperplastic sigmoid polyps, 10 mm polyps in the rectosigmoid junction-hyperplastic, multiple polyps in the rectum-hyperplastic, hemorrhoids  Colonoscopy 2015 Dr. Daley: 1.5 cm transverse colon polyp and a 7 mm descending colon polyp-no path available.

## 2024-03-05 NOTE — HPI-TODAY'S VISIT:
She is a pleasant 69-year-old female with history of chronic reflux, fatty liver disease.   She was referred to Dr. Carr. Undergoing evaluation to be placed in a clinical trial for her fatty liver disease. An MRI noted numerous lymph nodes in the perihepatic area and she was referred here for further evaluation. Melva was diagnosed with parainfluenza and RSV in December 2023. She denies any active gastrointestinal complaints at this time. She reports unintentional weight loss being on a low carbohydrate diet. She reports a chronic right upper quadrant pain (history of cholecystectomy) that radiates around to her right flank. She denies any associated nausea vomiting fevers chills or night sweats. CT angiogram done in the ER in December when she presented with shortness of breath noted mild CARI hilar adenopathy. She quit alcohol approximately 15 to 20 years ago. She continues smoke 2 packs of cigarettes per day.    Previous liver biopsy ( done at the timem of her cholecystectomy 2022) noted no bridging fibrosis but extensive steatosis with mild portal and periportal inflammation. Iron stains negative, focal and periportal fibrosis.   Oxygen dependent COPD

## 2024-03-05 NOTE — HPI-PREVIOUS LABS
Labs July 2021: Immune to hepatitis A, ERLINDA AMA ASMA negative, hepatitis B surface antigen negative, hepatitis B surface antibody negative, hep C negative, INR 0.95, Labs July 2021: Hemoglobin 15.3 g platelets 253,

## 2024-03-05 NOTE — HPI-PREVIOUS IMAGING
MRI scan February 2024: Normal liver contours, no visible suspicious lesions, hepatic cysts, numerous lymph nodes located near the liver hilum with the largest measuring 16 x 12 mm Cholecystectomy, normal pancreas adrenals kidneys spleen.  FibroScan February 2024 concerning for MASLD  decibels per meter   FibroScan August 2021: S3/F0, F1 FibroScan August 2021: PIPIDA scan August 2021 gallbladder ejection fraction 34% Ultrasound May 2021: ? 3 mm gallbladder wall polyp. No gallstones. Fatty liver, liver span 21.2 cm

## 2024-03-06 ENCOUNTER — LAB (OUTPATIENT)
Dept: URBAN - METROPOLITAN AREA CLINIC 63 | Facility: CLINIC | Age: 69
End: 2024-03-06

## 2024-03-21 ENCOUNTER — OV EP (OUTPATIENT)
Dept: URBAN - METROPOLITAN AREA CLINIC 63 | Facility: CLINIC | Age: 69
End: 2024-03-21
Payer: MEDICARE

## 2024-03-21 VITALS
WEIGHT: 189.8 LBS | OXYGEN SATURATION: 98 % | DIASTOLIC BLOOD PRESSURE: 80 MMHG | HEIGHT: 64 IN | HEART RATE: 86 BPM | SYSTOLIC BLOOD PRESSURE: 122 MMHG | TEMPERATURE: 96.8 F | BODY MASS INDEX: 32.4 KG/M2

## 2024-03-21 DIAGNOSIS — Z86.010 PERSONAL HISTORY OF COLONIC POLYPS: ICD-10-CM

## 2024-03-21 DIAGNOSIS — K76.0 FATTY LIVER: ICD-10-CM

## 2024-03-21 DIAGNOSIS — R93.89 ABNORMAL FINDING ON IMAGING: ICD-10-CM

## 2024-03-21 DIAGNOSIS — F17.219 CIGARETTE NICOTINE DEPENDENCE WITH NICOTINE-INDUCED DISORDER: ICD-10-CM

## 2024-03-21 DIAGNOSIS — R93.2 ABNORMAL LIVER DIAGNOSTIC IMAGING: ICD-10-CM

## 2024-03-21 PROCEDURE — 99215 OFFICE O/P EST HI 40 MIN: CPT | Performed by: INTERNAL MEDICINE

## 2024-03-21 RX ORDER — HYDROCHLOROTHIAZIDE 25 MG/1
TABLET ORAL
Qty: 90 TABLET | Status: ACTIVE | COMMUNITY

## 2024-03-21 RX ORDER — TELMISARTAN 80 MG/1
TABLET ORAL
Qty: 90 TABLET | Status: ACTIVE | COMMUNITY

## 2024-03-21 RX ORDER — BENZALKONIUM CHLORIDE 1.3 MG/ML
BIPAP AT NIGHT FOR SLEEP APNEA CLOTH TOPICAL
Refills: 0 | Status: ACTIVE | COMMUNITY
Start: 2022-03-10

## 2024-03-21 RX ORDER — GABAPENTIN 300 MG/1
CAPSULE ORAL
Qty: 270 CAPSULE | Status: ACTIVE | COMMUNITY

## 2024-03-21 RX ORDER — ROSUVASTATIN CALCIUM 20 MG/1
TAKE 1 TABLET BY MOUTH EVERY DAY TABLET, FILM COATED ORAL
Qty: 90 EACH | Refills: 0 | Status: ACTIVE | COMMUNITY

## 2024-03-21 RX ORDER — CLONAZEPAM 0.5 MG/1
0.5 TABS BID TABLET ORAL TAKE AS DIRECTED
Refills: 0 | Status: ACTIVE | COMMUNITY
Start: 2022-03-10

## 2024-03-21 RX ORDER — PAROXETINE HYDROCHLORIDE HEMIHYDRATE 20 MG/1
TABLET, FILM COATED ORAL ONCE A DAY
Refills: 0 | Status: ACTIVE | COMMUNITY
Start: 2022-03-10

## 2024-03-21 RX ORDER — MELOXICAM 7.5 MG
1 TABLET TABLET ORAL ONCE A DAY
Status: ACTIVE | COMMUNITY

## 2024-03-21 RX ORDER — BUDESONIDE, GLYCOPYRROLATE, AND FORMOTEROL FUMARATE 160; 9; 4.8 UG/1; UG/1; UG/1
AEROSOL, METERED RESPIRATORY (INHALATION) AS NEEDED
Refills: 0 | Status: ACTIVE | COMMUNITY
Start: 2022-03-10

## 2024-03-21 NOTE — HPI-PREVIOUS IMAGING
CT scan Abdomen with and without contrast March 2024: Diffuse hepatic steatosis, nodular liver contour suggestive of liver disease, hypoattenuating lesion of the inferior right hepatic lobe 0.7 x 0.6 cm. Cholecystectomy, focal area of fatty sparing at the gallbladder fossa, normal spleen, normal adrenals, no pancreatic ductal dilation and no focal pancreatic lesion. Tiny nonobstructing calculus of the left lower pole kidney. No bowel obstruction and no mesenteric stranding   MRI scan February 2024: Normal liver contours, no visible suspicious lesions, hepatic cysts, numerous lymph nodes located near the liver hilum with the largest measuring 16 x 12 mm Cholecystectomy, normal pancreas adrenals kidneys spleen.  FibroScan February 2024 concerning for MASLD  decibels per meter   FibroScan August 2021: S3/F0, F1 FibroScan August 2021: PIPIDA scan August 2021 gallbladder ejection fraction 34% Ultrasound May 2021: ? 3 mm gallbladder wall polyp. No gallstones. Fatty liver, liver span 21.2 cm

## 2024-03-21 NOTE — HPI-PREVIOUS LABS
Labs February 2024: WBC 12.7 (H), hemoglobin 14.7 g, MCV 93, platelets 236, prothrombin time 13.3 INR 1.0, Total cholesterol 177 triglycerides 282 (H), LDL 98, HDL 23 hemoglobin A1c 6.7 (H), Total bilirubin 0.51 AST/ALT 58/52, alkaline phosphatase 84,  (H), serum albumin 4.9, BUN 22 creatinine 0.63 sodium 152 (H).  Labs July 2021: Immune to hepatitis A, ERLINDA AMA ASMA negative, hepatitis B surface antigen negative, hepatitis B surface antibody negative, hep C negative, INR 0.95, Labs July 2021: Hemoglobin 15.3 g platelets 253,

## 2024-03-21 NOTE — HPI-TODAY'S VISIT:
She is a pleasant 69-year-old female with history of chronic reflux, fatty liver disease.  Melva is here today in follow-up. She has a history of Coelho and was referred to Dr. Carr. During the workup an MRI noted evidence of prominent lymph nodes in the hilum and she was referred here. I discussed with the radiologist Dr. Suárez who recommended getting another CT scan. Repeat CAT scan noted evidence of possible cirrhosis and no evidence of any significant lymphadenopathy other than what would be expected to be found with cirrhosis. Melva is now distraught about finding out that she possibly could have cirrhosis (nodular liver on the CAT scan.) However upon reviewing her labs from February ordered by Dr. Carr-no thrombocytopenia, normal prothrombin time INR and serum albumin and normal liver enzymes. Previous liver biopsy ( done at the timem of her cholecystectomy 2022) noted no bridging fibrosis but extensive steatosis with mild portal and periportal inflammation. Iron stains negative, focal and periportal fibrosis.  She was reassured. Currently she denies any symptoms from a GI standpoint. She denies reflux or dysphagia. Reports no abdominal pain or increase in abdominal girth. Denies any jaundice. Denies any easy bruising. Denies any abdominal pain constipation diarrhea rectal bleeding or melena. Continues to smoke 2 packs of cigarettes per day. She quit alcohol approximately 15 to 20 years ago. Reports fatigue.   Melva was diagnosed with parainfluenza and RSV in December 2023. She denies any active gastrointestinal complaints at this time. She reports unintentional weight loss being on a low carbohydrate diet. She reports a chronic right upper quadrant pain (history of cholecystectomy) that radiates around to her right flank. She denies any associated nausea vomiting fevers chills or night sweats.   CT angiogram done in the ER in December when she presented with shortness of breath noted mild CARI hilar adenopathy.   Oxygen dependent COPD

## 2024-03-21 NOTE — PHYSICAL EXAM GASTROINTESTINAL
soft, nontender, nondistended , no masses palpable , normal bowel sounds , Liver PALPABLE 4-5 CM BELOW RCM - NO FLUID THRILL

## 2024-05-20 ENCOUNTER — DASHBOARD ENCOUNTERS (OUTPATIENT)
Age: 69
End: 2024-05-20

## 2024-05-28 ENCOUNTER — OFFICE VISIT (OUTPATIENT)
Dept: URBAN - METROPOLITAN AREA CLINIC 60 | Facility: CLINIC | Age: 69
End: 2024-05-28

## 2024-05-28 RX ORDER — PAROXETINE HYDROCHLORIDE HEMIHYDRATE 20 MG/1
TABLET, FILM COATED ORAL ONCE A DAY
Refills: 0 | COMMUNITY
Start: 2022-03-10

## 2024-05-28 RX ORDER — ROSUVASTATIN CALCIUM 20 MG/1
TAKE 1 TABLET BY MOUTH EVERY DAY TABLET, FILM COATED ORAL
Qty: 90 EACH | Refills: 0 | COMMUNITY

## 2024-05-28 RX ORDER — BENZALKONIUM CHLORIDE 1.3 MG/ML
BIPAP AT NIGHT FOR SLEEP APNEA CLOTH TOPICAL
Refills: 0 | COMMUNITY
Start: 2022-03-10

## 2024-05-28 RX ORDER — HYDROCHLOROTHIAZIDE 25 MG/1
TABLET ORAL
Qty: 90 TABLET | COMMUNITY

## 2024-05-28 RX ORDER — BUDESONIDE, GLYCOPYRROLATE, AND FORMOTEROL FUMARATE 160; 9; 4.8 UG/1; UG/1; UG/1
AEROSOL, METERED RESPIRATORY (INHALATION) AS NEEDED
Refills: 0 | COMMUNITY
Start: 2022-03-10

## 2024-05-28 RX ORDER — CLONAZEPAM 0.5 MG/1
0.5 TABS BID TABLET ORAL TAKE AS DIRECTED
Refills: 0 | COMMUNITY
Start: 2022-03-10

## 2024-05-28 RX ORDER — TELMISARTAN 80 MG/1
TABLET ORAL
Qty: 90 TABLET | COMMUNITY

## 2024-05-28 RX ORDER — GABAPENTIN 300 MG/1
CAPSULE ORAL
Qty: 270 CAPSULE | COMMUNITY

## 2024-05-28 RX ORDER — MELOXICAM 7.5 MG
1 TABLET TABLET ORAL ONCE A DAY
COMMUNITY

## 2024-06-27 NOTE — PATIENT DISCUSSION
Discussed the risks benefits alternatives and limitations of cataract surgery including infection bleeding loss of vision retinal tears detachment. The patient stated a full understanding and a desire to proceed with the procedure in both eyes. Refractive options were reviewed. Patient has elected to be optimized for distance vision in both eyes. The patient will still need glasses for reading and to possibly fine tune distance vision. Schedule KPE/IOL OD/OS discussed no driving prior to surgery. Return for an appointment for Admit. with Dr. Robyn Zheng. Needs Full Admit. Mireya called back to provided a new phone number for Leti MCKENZIE to call her at. [phone].    For your review.

## 2024-11-21 ENCOUNTER — TELEPHONE ENCOUNTER (OUTPATIENT)
Dept: URBAN - METROPOLITAN AREA CLINIC 64 | Facility: CLINIC | Age: 69
End: 2024-11-21

## 2024-11-22 ENCOUNTER — OFFICE VISIT (OUTPATIENT)
Dept: URBAN - METROPOLITAN AREA CLINIC 63 | Facility: CLINIC | Age: 69
End: 2024-11-22
Payer: MEDICARE

## 2024-11-22 VITALS
TEMPERATURE: 98.4 F | RESPIRATION RATE: 16 BRPM | BODY MASS INDEX: 31.07 KG/M2 | HEART RATE: 105 BPM | DIASTOLIC BLOOD PRESSURE: 80 MMHG | WEIGHT: 182 LBS | HEIGHT: 64 IN | OXYGEN SATURATION: 88 % | SYSTOLIC BLOOD PRESSURE: 132 MMHG

## 2024-11-22 DIAGNOSIS — R19.7 ACUTE DIARRHEA: ICD-10-CM

## 2024-11-22 DIAGNOSIS — Z12.11 COLON CANCER SCREENING: ICD-10-CM

## 2024-11-22 DIAGNOSIS — K76.0 FATTY LIVER: ICD-10-CM

## 2024-11-22 PROCEDURE — 99214 OFFICE O/P EST MOD 30 MIN: CPT

## 2024-11-22 RX ORDER — SULFAMETHOXAZOLE AND TRIMETHOPRIM 800; 160 MG/1; MG/1
1 TABLET TABLET ORAL
Status: ACTIVE | COMMUNITY

## 2024-11-22 RX ORDER — GABAPENTIN 300 MG/1
CAPSULE ORAL
Qty: 270 CAPSULE | Status: ACTIVE | COMMUNITY

## 2024-11-22 RX ORDER — PAROXETINE HYDROCHLORIDE HEMIHYDRATE 20 MG/1
TABLET, FILM COATED ORAL ONCE A DAY
Refills: 0 | Status: ACTIVE | COMMUNITY
Start: 2022-03-10

## 2024-11-22 RX ORDER — TELMISARTAN 80 MG/1
TABLET ORAL
Qty: 90 TABLET | Status: ACTIVE | COMMUNITY

## 2024-11-22 RX ORDER — BUDESONIDE, GLYCOPYRROLATE, AND FORMOTEROL FUMARATE 160; 9; 4.8 UG/1; UG/1; UG/1
AEROSOL, METERED RESPIRATORY (INHALATION) AS NEEDED
Refills: 0 | Status: ACTIVE | COMMUNITY
Start: 2022-03-10

## 2024-11-22 RX ORDER — HYDROCHLOROTHIAZIDE 25 MG/1
TABLET ORAL
Qty: 90 TABLET | Status: ACTIVE | COMMUNITY

## 2024-11-22 RX ORDER — BENZALKONIUM CHLORIDE 1.3 MG/ML
BIPAP AT NIGHT FOR SLEEP APNEA CLOTH TOPICAL
Refills: 0 | Status: ACTIVE | COMMUNITY
Start: 2022-03-10

## 2024-11-22 RX ORDER — ROSUVASTATIN CALCIUM 20 MG/1
TAKE 1 TABLET BY MOUTH EVERY DAY TABLET, FILM COATED ORAL
Qty: 90 EACH | Refills: 0 | Status: ACTIVE | COMMUNITY

## 2024-11-22 RX ORDER — CLONAZEPAM 0.5 MG/1
0.5 TABS BID TABLET ORAL TAKE AS DIRECTED
Refills: 0 | Status: ACTIVE | COMMUNITY
Start: 2022-03-10

## 2024-11-22 RX ORDER — MELOXICAM 7.5 MG
1 TABLET TABLET ORAL ONCE A DAY
Status: ACTIVE | COMMUNITY

## 2024-11-22 NOTE — HPI-TODAY'S VISIT:
Patient comes in complaining of diarrhea over the last 4 weeks.  She is typically having anywhere from 2-5 soft to watery stools per day previous to that she was very constipated and had to disimpact herself.  She states she has had 1 other instance with constant diarrhea that happened a while ago.  Denies any signs of GI bleeding.  She does have lower abdominal cramping prior to diarrhea with urgency.  She was due for her colonoscopy and will need to be performed in a hospital setting due to oxygen dependency.  We will send the referral over to [facility] to do colonoscopy for her colon cancer prevention as well as change in bowel habits.  She has not tried anything for her diarrhea.  I advised Metamucil as well as Imodium as needed.  She continues to follow-up with Dr. Carr and is currently ongoing the clinical trial.  She already checked with him and he did not seem convinced that the diarrhea could be caused by the medication.  Additionally noted on the referral that Dr. Stone wanted to have patient proceed with an EGD for evaluation of esophageal varices the next time she had a colonoscopy.  Denies any brbpr, melena, abdominal pain, unintentional weight loss, early satiety, fever, chills,  constipation, dysphagia, odynophagia, or reflux.   Last OV Dr. Stone 3/2024: She is a pleasant 69-year-old female with history of chronic reflux, fatty liver disease.  Melva is here today in follow-up. She has a history of Coelho and was referred to Dr. Carr. During the workup an MRI noted evidence of prominent lymph nodes in the hilum and she was referred here. I discussed with the radiologist Dr. Suárez who recommended getting another CT scan. Repeat CAT scan noted evidence of possible cirrhosis and no evidence of any significant lymphadenopathy other than what would be expected to be found with cirrhosis. Melva is now distraught about finding out that she possibly could have cirrhosis (nodular liver on the CAT scan.) However upon reviewing her labs from February ordered by Dr. Carr-no thrombocytopenia, normal prothrombin time INR and serum albumin and normal liver enzymes. Previous liver biopsy ( done at the timem of her cholecystectomy 2022) noted no bridging fibrosis but extensive steatosis with mild portal and periportal inflammation. Iron stains negative, focal and periportal fibrosis.  She was reassured. Currently she denies any symptoms from a GI standpoint. She denies reflux or dysphagia. Reports no abdominal pain or increase in abdominal girth. Denies any jaundice. Denies any easy bruising. Denies any abdominal pain constipation diarrhea rectal bleeding or melena. Continues to smoke 2 packs of cigarettes per day. She quit alcohol approximately 15 to 20 years ago. Reports fatigue.   Melva was diagnosed with parainfluenza and RSV in December 2023. She denies any active gastrointestinal complaints at this time. She reports unintentional weight loss being on a low carbohydrate diet. She reports a chronic right upper quadrant pain (history of cholecystectomy) that radiates around to her right flank. She denies any associated nausea vomiting fevers chills or night sweats.   CT angiogram done in the ER in December when she presented with shortness of breath noted mild CARI hilar adenopathy.   Oxygen dependent COPD

## 2024-11-22 NOTE — PHYSICAL EXAM EYES:
Conjuntivae and eyelids appear normal, Sclerae : White without injection Pt is Full Code.  and would like all rescus measures as needed.  pt states her son Ulices is her HCP.

## 2024-11-22 NOTE — HPI-PREVIOUS LABS
Labs February 2024: WBC 12.7 (H), hemoglobin 14.7 g, MCV 93, platelets 236, prothrombin time 13.3 INR 1.0, Total cholesterol 177 triglycerides 282 (H), LDL 98, HDL 23 hemoglobin A1c 6.7 (H), Total bilirubin 0.51 AST/ALT 58/52, alkaline phosphatase 84,  (H), serum albumin 4.9, BUN 22 creatinine 0.63 sodium 152 (H). apolipoprotein B 123 (H),  Labs July 2021: Immune to hepatitis A, ERLINDA AMA ASMA negative, hepatitis B surface antigen negative, hepatitis B surface antibody negative, hep C negative, INR 0.95, Labs July 2021: Hemoglobin 15.3 g platelets 253,

## 2024-12-20 ENCOUNTER — OFFICE VISIT (OUTPATIENT)
Dept: URBAN - METROPOLITAN AREA CLINIC 63 | Facility: CLINIC | Age: 69
End: 2024-12-20